# Patient Record
(demographics unavailable — no encounter records)

---

## 2024-10-28 NOTE — HISTORY OF PRESENT ILLNESS
[FreeTextEntry1] : 10/28/2024: TANJA RESTREPO is a 62 year old female presenting to the office for an initial evaluation of left ankle distal fibula fracture which she sustained on 10/26/2024 after she took an accidental fall in her kitchen.  She went to [facility] urgent care where they diagnosed her with a fracture.  She presents wearing an Aircast, using a cane, limping because of the left ankle pain.  She has bruising and swelling of her left ankle mostly on the outside portion of her ankle where all her pain is/fracture.  Her pain scale today in the office is 4 out of 10.  She presents with her spouse in office today.  She is not on aspirin or DVT prophylaxis.  No other complaints.

## 2024-10-28 NOTE — DISCUSSION/SUMMARY
[de-identified] : X-[facility] reviewed from [facility] with the patient.  Patient did fracture the distal lateral malleolus, nonsurgical fracture.  Patient given a long cam walking boot to protect the fracture for the next 4 to 6 weeks.  Fracture care discussed.  Continue with RICE therapy for swelling control.  Patient can use a cane and weight-bear as tolerated with the boot on.  Aspirin 81 mg sent to her pharmacy to use daily for DVT prophylaxis while she is in the boot.  I want to see the patient back in office in 2 weeks for continued fracture care and new x-rays.  If anything changes, the patient should return to office sooner for an x-ray.  All of her questions were answered.  She understood and agreed to the treatment course.

## 2024-10-28 NOTE — PHYSICAL EXAM
[de-identified] : Left ankle Physical Examination:   General: Alert and oriented x3.  In no acute distress.  Pleasant in nature with a normal affect.  No apparent respiratory distress. Erythema, Warmth, Rubor: Negative Swelling: Positive swelling and bruising present lateral ankle.   ROM: With pain due to the fracture. 1. Dorsiflexion: 0 degrees 2. Plantarflexion: 40 degrees 3. 10 degrees of subtalar motion 4. Inversion: 20 degrees 5. Eversion: 20 degrees   Tenderness to Palpation: 1. Lateral Malleolus: + Distally. 2. Medial Malleolus: Negative 3. Proximal Fibular Pain: Negative 4. Heel Pain: Negative 5. Cuboid: Negative 6. Navicular: Negative 7. Tibiotalar Joint: Negative 8. Subtalar Joint: Negative 9. Posterior Recess: Negative   Tendon Pain: 1. Achilles: Negative 2. Peroneals: Negative 3. Posterior Tibialis: Negative 4. Tibialis Anterior: Negative   Ligament Pain: 1. ATFL: Negative 2. CFL: Negative 3. PTFL: Negative 4. Deltoid Ligaments: Negative 5. Lis Franc Ligament: Negative   Stability: 1. Anterior Drawer: Negative 2. Posterior Drawer: Negative   Strength: 5/5 TA/GS/EHL   Pulses: 2+ DP/PT Pulses   Neuro: Intact motor and sensory   Additional Test: 1. Calcaneal Squeeze Test: Negative 2. Syndesmosis Squeeze Test: Negative 3. Figueredo Test: Negative  [de-identified] : 3 views x-rays left ankle reviewed from [facility] on 10/26/2024: Nondisplaced distal fibula fracture.

## 2024-11-11 NOTE — DISCUSSION/SUMMARY
[de-identified] : Today I had a lengthy discussion with the patient regarding their left ankle, distal fibular fracture. I have addressed all the patient's concerns surrounding the pathology of their condition. I have reviewed the patient's XR imaging with them in great detail. Nondisplaced distal fibula fracture. Fracture is healing well and in good alignment. The patient will continue with fracture care and conservative treatments.    Plan:  1.  I recommend the patient undergo a course of physical therapy for the left ankle 2-3 times a week for a total of 8-12 weeks. A prescription was given for the physical therapy today. 2. I recommend that the patient utilize ice, NSAIDS/Tylenol PRN, and heat. They can also elevate their LLE above the level of the heart. 3. Continue wearing the CAM boot and crutches. 4. Continue using 81 mg aspirin for DVT prophylaxis. 5. Continue using Voltaren gel.  f/u in 3 weeks   The patient understood and verbally agreed to the treatment plan. All of their questions were answered, and they were satisfied with the visit. The patient should call the office if they have any questions or experience worsening symptoms.

## 2024-11-11 NOTE — HISTORY OF PRESENT ILLNESS
[FreeTextEntry1] : 11/11/2024: TANJA RESTREPO is a 62 year old female presenting to the office for a follow up evaluation of his left ankle distal fibula fracture. Her symptoms have improved since last visit. She has been weight bearing with the CAM boot. She is using 81 mg aspirin for DVT prophylaxis. She is using Voltaren gel, which has reduced her swelling and bruising. The patient presents to the office NWB wearing a CAM boot and ambulating with crutches.  10/28/2024: TANJA RESTREPO is a 62 year old female presenting to the office for an initial evaluation of left ankle distal fibula fracture which she sustained on 10/26/2024 after she took an accidental fall in her kitchen.  She went to [facility] urgent care where they diagnosed her with a fracture.  She presents wearing an Aircast, using a cane, limping because of the left ankle pain.  She has bruising and swelling of her left ankle mostly on the outside portion of her ankle where all her pain is/fracture.  Her pain scale today in the office is 4 out of 10.  She presents with her spouse in office today.  She is not on aspirin or DVT prophylaxis.  No other complaints.

## 2024-11-11 NOTE — ADDENDUM
[FreeTextEntry1] : I, All Sudhir, acted solely as a scribe for Dr. Joe Bryant on this date 11/11/2024.   All medical record entries made by the Scribe were at my, Dr. Joe Bryant, direction and personally dictated by me on 11/11/2024. I have reviewed the chart and agree that the record accurately reflects my personal performance of the history, physical exam, assessment and plan. I have also personally directed, reviewed, and agreed with the chart.

## 2024-11-11 NOTE — PHYSICAL EXAM
[de-identified] : Left ankle Physical Examination:   General: Alert and oriented x3.  In no acute distress.  Pleasant in nature with a normal affect.  No apparent respiratory distress. Erythema, Warmth, Rubor: Negative Swelling: Positive swelling and bruising present lateral ankle.   ROM: With pain due to the fracture. 1. Dorsiflexion: 0 degrees 2. Plantarflexion: 40 degrees 3. 10 degrees of subtalar motion 4. Inversion: 20 degrees 5. Eversion: 20 degrees   Tenderness to Palpation: 1. Lateral Malleolus: + Distally, improved 2. Medial Malleolus: Negative 3. Proximal Fibular Pain: Negative 4. Heel Pain: Negative 5. Cuboid: Negative 6. Navicular: Negative 7. Tibiotalar Joint: Negative 8. Subtalar Joint: Negative 9. Posterior Recess: Negative   Tendon Pain: 1. Achilles: Negative 2. Peroneals: Negative 3. Posterior Tibialis: Negative 4. Tibialis Anterior: Negative   Ligament Pain: 1. ATFL: Negative 2. CFL: Negative 3. PTFL: Negative 4. Deltoid Ligaments: Negative 5. Lis Franc Ligament: Negative   Stability: 1. Anterior Drawer: Negative 2. Posterior Drawer: Negative   Strength: 5/5 TA/GS/EHL   Pulses: 2+ DP/PT Pulses   Neuro: Intact motor and sensory   Additional Test: 1. Calcaneal Squeeze Test: Negative 2. Syndesmosis Squeeze Test: Negative 3. Figueredo Test: Negative  [de-identified] : 3V of the left ankle were ordered, obtained and reviewed by me today, 11/11/2024, and revealed: Nondisplaced distal fibula fracture. Fracture is healing well and in good alignment.

## 2024-11-11 NOTE — REASON FOR VISIT
[Follow-Up Visit] : a follow-up visit for [Ankle Pain] : ankle pain [FreeTextEntry2] : left ankle pain

## 2024-12-05 NOTE — DISCUSSION/SUMMARY
[de-identified] :  Today I had a lengthy discussion with the patient regarding their left ankle distal fibula fracture pain. I have addressed all the patient's concerns surrounding the pathology of their condition.  I have reviewed the patient's XR imaging with them in great detail. I recommend that the patient utilize ice, NSAIDS PRN, and heat. They can also elevate their LLE above the level of the heart.  A discussion was had about shoe-wear modifications. I advised the patient to utilize a wide toed cross training sneaker that better accommodates the feet. I recommended New Balance, [employer], or [employer] to the patient.  The patient understood and verbally agreed to the treatment plan. All of their questions were answered and they were satisfied with the visit. The patient should call the office if they have any questions or experience worsening symptoms.  FU as needed.

## 2024-12-05 NOTE — HISTORY OF PRESENT ILLNESS
[FreeTextEntry1] : 12/5/2024: The patient is a 62-year-old female who presents to the office for follow evaluation of his left ankle distal fibula fracture. She reports that her symptoms have improved since her last visit to the office.  The patient presents to the office in sneakers and ambulating without assistance.  11/11/2024: TANJA RESTREPO is a 62-year-old female presenting to the office for a follow up evaluation of his left ankle distal fibula fracture. Her symptoms have improved since last visit. She has been weight bearing with the CAM boot. She is using 81 mg aspirin for DVT prophylaxis. She is using Voltaren gel, which has reduced her swelling and bruising. The patient presents to the office NWB wearing a CAM boot and ambulating with crutches.  10/28/2024: TANJA RESTREPO is a 62-year-old female presenting to the office for an initial evaluation of left ankle distal fibula fracture which she sustained on 10/26/2024 after she took an accidental fall in her kitchen.  She went to [facility] urgent care where they diagnosed her with a fracture.  She presents wearing an Aircast, using a cane, limping because of the left ankle pain.  She has bruising and swelling of her left ankle mostly on the outside portion of her ankle where all her pain is/fracture.  Her pain scale today in the office is 4 out of 10.  She presents with her spouse in office today.  She is not on aspirin or DVT prophylaxis.  No other complaints.

## 2024-12-05 NOTE — PHYSICAL EXAM
[de-identified] : Left ankle Physical Examination:   General: Alert and oriented x3.  In no acute distress.  Pleasant in nature with a normal affect.  No apparent respiratory distress. Erythema, Warmth, Rubor: Negative Swelling: Positive swelling and bruising present lateral ankle.   ROM: With pain due to the fracture. 1. Dorsiflexion: 0 degrees 2. Plantarflexion: 40 degrees 3. 10 degrees of subtalar motion 4. Inversion: 20 degrees 5. Eversion: 20 degrees   Tenderness to Palpation: 1. Lateral Malleolus: + Distally, improved 2. Medial Malleolus: Negative 3. Proximal Fibular Pain: Negative 4. Heel Pain: Negative 5. Cuboid: Negative 6. Navicular: Negative 7. Tibiotalar Joint: Negative 8. Subtalar Joint: Negative 9. Posterior Recess: Negative   Tendon Pain: 1. Achilles: Negative 2. Peroneals: Negative 3. Posterior Tibialis: Negative 4. Tibialis Anterior: Negative   Ligament Pain: 1. ATFL: Negative 2. CFL: Negative 3. PTFL: Negative 4. Deltoid Ligaments: Negative 5. Lis Franc Ligament: Negative   Stability: 1. Anterior Drawer: Negative 2. Posterior Drawer: Negative   Strength: 5/5 TA/GS/EHL   Pulses: 2+ DP/PT Pulses   Neuro: Intact motor and sensory   Additional Test: 1. Calcaneal Squeeze Test: Negative 2. Syndesmosis Squeeze Test: Negative 3. Figueredo Test: Negative  [de-identified] : 3V of the left ankle were ordered, obtained and reviewed by me today, 12/5/2024, and revealed: Nondisplaced distal fibula fracture. Fracture is healing well and in good alignment.

## 2024-12-05 NOTE — ADDENDUM
[FreeTextEntry1] : I, Joe Izaguirre, acted solely as a scribe for Dr. Joe Bryant on this date 12/05/2024  .   All medical record entries made by the Scribe were at my, Dr. Joe Bryant, direction and personally dictated by me on 12/05/2024 . I have reviewed the chart and agree that the record accurately reflects my personal performance of the history, physical exam, assessment and plan. I have also personally directed, reviewed, and agreed with the chart.

## 2025-03-05 NOTE — HEALTH RISK ASSESSMENT
[No] : In the past 12 months have you used drugs other than those required for medical reasons? No [0] : 2) Feeling down, depressed, or hopeless: Not at all (0) [PHQ-2 Negative - No further assessment needed] : PHQ-2 Negative - No further assessment needed [Never] : Never [Patient reported mammogram was normal] : Patient reported mammogram was normal [Patient reported colonoscopy was normal] : Patient reported colonoscopy was normal [No falls in past year] : Patient reported no falls in the past year [Audit-CScore] : 0 [de-identified] : walks [de-identified] : healthy [[id number]] : 0 [EyeExamDate] : 01/2023 [] :  [Fully functional (bathing, dressing, toileting, transferring, walking, feeding)] : Fully functional (bathing, dressing, toileting, transferring, walking, feeding) [Fully functional (using the telephone, shopping, preparing meals, housekeeping, doing laundry, using] : Fully functional and needs no help or supervision to perform IADLs (using the telephone, shopping, preparing meals, housekeeping, doing laundry, using transportation, managing medications and managing finances) [Reports changes in hearing] : Reports no changes in hearing [Reports changes in vision] : Reports no changes in vision [Reports changes in dental health] : Reports no changes in dental health [MammogramDate] : 12/2024 [PapSmearDate] : 01/2021 [BoneDensityDate] : 12/2024 [ColonoscopyDate] : 09/2018

## 2025-03-05 NOTE — HISTORY OF PRESENT ILLNESS
[FreeTextEntry1] : cpe [de-identified] : 62 year F presents for annual physical exam. PMH osteoporosis, former smoker Feeling well with no complaints. 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  She is less anxious and stressed out She did lost 15lbs.  She states she has been eating and does not feel weak